# Patient Record
(demographics unavailable — no encounter records)

---

## 2025-03-21 NOTE — HISTORY OF PRESENT ILLNESS
[N] : Patient denies prior pregnancies [TextBox_4] : 60-year-old para 0-0-0-0 postmenopausal, she denies any vaginal bleeding. She presents for rePAP as the last one had insufficient cells.   She has a history of a left simple ovarian cyst for which she receives an annual pelvic sonogram, and she has consulted with both GYN oncologist Dr. Harris as well as Dr. Finn in regard to the same.  On her last ultrasound on February 4, 2025 the cyst measured 2.7 x 2.7 x 2.8 cm and was a simple cyst.  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  She has a history of osteoporosis and she continues to decline treatment for it.   [Mammogramdate] : 11/6/2024 [PapSmeardate] : 3/21/2025 [BoneDensityDate] : 2/4/2025 [ColonoscopyDate] : 2023 [Currently Active] : currently active [Women] : women

## 2025-03-21 NOTE — PHYSICAL EXAM
[Chaperone Present] : A chaperone was present in the examining room during all aspects of the physical examination [FreeTextEntry2] : Lindsay  [Labia Majora] : normal [Labia Minora] : normal [Normal] : normal